# Patient Record
Sex: MALE | URBAN - METROPOLITAN AREA
[De-identification: names, ages, dates, MRNs, and addresses within clinical notes are randomized per-mention and may not be internally consistent; named-entity substitution may affect disease eponyms.]

---

## 2018-08-21 ENCOUNTER — IMPORTED ENCOUNTER (OUTPATIENT)
Dept: URBAN - METROPOLITAN AREA CLINIC 1 | Facility: CLINIC | Age: 63
End: 2018-08-21

## 2018-08-21 PROBLEM — H49.22: Noted: 2018-08-21

## 2018-08-21 PROBLEM — Z96.1: Noted: 2018-08-21

## 2018-08-21 PROBLEM — H49.02: Noted: 2018-08-21

## 2018-08-21 PROCEDURE — 92004 COMPRE OPH EXAM NEW PT 1/>: CPT

## 2018-08-21 NOTE — PATIENT DISCUSSION
1.  Sixth Nerve Palsy OS -- the patient has signs and symptoms consistent with a left sixth nerve palsy. The condition and possible etiologies were discussed with the patient. He is being referred to ER for diagnostic work up imaging  and Neuro consult. Sudden onset and also has sluggish pupil on the left. 2. Pseudophakia OU 3. Return for an appointment in 1 wk for 10 with Dr. West Perez.

## 2018-08-21 NOTE — PATIENT DISCUSSION
Third Nerve Palsy complete OS -- the patient has signs and symptoms consistent with a complete left third nerve palsy. The condition and possible etiologies were discussed with the patient.

## 2022-04-03 ASSESSMENT — TONOMETRY
OS_IOP_MMHG: 19
OD_IOP_MMHG: 18

## 2022-04-03 ASSESSMENT — VISUAL ACUITY
OS_SC: 20/40
OD_SC: 20/20-1